# Patient Record
Sex: FEMALE | Race: WHITE
[De-identification: names, ages, dates, MRNs, and addresses within clinical notes are randomized per-mention and may not be internally consistent; named-entity substitution may affect disease eponyms.]

---
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
## 2021-06-14 ENCOUNTER — LABORATORY RESULT (OUTPATIENT)
Age: 21
End: 2021-06-14

## 2021-06-14 ENCOUNTER — APPOINTMENT (OUTPATIENT)
Dept: PEDIATRICS | Facility: CLINIC | Age: 21
End: 2021-06-14
Payer: COMMERCIAL

## 2021-06-14 VITALS
HEIGHT: 67 IN | RESPIRATION RATE: 12 BRPM | SYSTOLIC BLOOD PRESSURE: 108 MMHG | BODY MASS INDEX: 20.78 KG/M2 | WEIGHT: 132.38 LBS | DIASTOLIC BLOOD PRESSURE: 74 MMHG | TEMPERATURE: 98 F | HEART RATE: 80 BPM

## 2021-06-14 DIAGNOSIS — R11.2 NAUSEA WITH VOMITING, UNSPECIFIED: ICD-10-CM

## 2021-06-14 DIAGNOSIS — Z00.01 ENCOUNTER FOR GENERAL ADULT MEDICAL EXAMINATION WITH ABNORMAL FINDINGS: ICD-10-CM

## 2021-06-14 LAB
BILIRUB UR QL STRIP: NORMAL
CLARITY UR: CLEAR
GLUCOSE UR-MCNC: NORMAL
HCG UR QL: 0.2 EU/DL
HGB UR QL STRIP.AUTO: NORMAL
KETONES UR-MCNC: NORMAL
LEUKOCYTE ESTERASE UR QL STRIP: NORMAL
NITRITE UR QL STRIP: NORMAL
PH UR STRIP: 7
PROT UR STRIP-MCNC: NORMAL
SP GR UR STRIP: 1.01

## 2021-06-14 PROCEDURE — 92551 PURE TONE HEARING TEST AIR: CPT

## 2021-06-14 PROCEDURE — 81003 URINALYSIS AUTO W/O SCOPE: CPT | Mod: QW

## 2021-06-14 PROCEDURE — 90715 TDAP VACCINE 7 YRS/> IM: CPT

## 2021-06-14 PROCEDURE — 90471 IMMUNIZATION ADMIN: CPT

## 2021-06-14 PROCEDURE — 90472 IMMUNIZATION ADMIN EACH ADD: CPT

## 2021-06-14 PROCEDURE — 99385 PREV VISIT NEW AGE 18-39: CPT | Mod: 25

## 2021-06-14 NOTE — HISTORY OF PRESENT ILLNESS
[Needs Immunizations] : needs immunizations [Eats meals with family] : eats meals with family [Has family members/adults to turn to for help] : has family members/adults to turn to for help [Is permitted and is able to make independent decisions] : Is permitted and is able to make independent decisions [Grade: ____] : Grade: [unfilled] [Normal Performance] : normal performance [Eats regular meals including adequate fruits and vegetables] : eats regular meals including adequate fruits and vegetables [Has friends] : has friends [At least 1 hour of physical activity a day] : at least 1 hour of physical activity a day [Drinks alcohol] : drinks alcohol [No] : No cigarette smoke exposure [Uses safety belts/safety equipment] : uses safety belts/safety equipment  [Yes] : Patient has had sexual intercourse. [Has ways to cope with stress] : has ways to cope with stress [Displays self-confidence] : displays self-confidence [With Teen] : teen [Sleep Concerns] : no sleep concerns [Has concerns about body or appearance] : does not have concerns about body or appearance [Uses electronic nicotine delivery system] : does not use electronic nicotine delivery system [Exposure to electronic nicotine delivery system] : no exposure to electronic nicotine delivery system [Uses tobacco] : does not use tobacco [Exposure to tobacco] : no exposure to tobacco [Uses drugs] : does not use drugs  [Exposure to drugs] : no exposure to drugs [Exposure to alcohol] : no exposure to alcohol [Has problems with sleep] : does not have problems with sleep [Gets depressed, anxious, or irritable/has mood swings] : does not get depressed, anxious, or irritable/has mood swings [Has thought about hurting self or considered suicide] : has not thought about hurting self or considered suicide [de-identified] : self [de-identified] : see below [FreeTextEntry8] : IUD no menses [de-identified] : vegetarian, exercises  [de-identified] : occasional socially [de-identified] : boyfriend, condoms [FreeTextEntry1] : 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 eats a lot of soy products. also with emesis post meals stool pattern fluctuates both non bloody\par works out daily\par degree in education

## 2021-06-14 NOTE — HISTORY OF PRESENT ILLNESS
[Needs Immunizations] : needs immunizations [Eats meals with family] : eats meals with family [Has family members/adults to turn to for help] : has family members/adults to turn to for help [Is permitted and is able to make independent decisions] : Is permitted and is able to make independent decisions [Grade: ____] : Grade: [unfilled] [Normal Performance] : normal performance [Eats regular meals including adequate fruits and vegetables] : eats regular meals including adequate fruits and vegetables [Has friends] : has friends [At least 1 hour of physical activity a day] : at least 1 hour of physical activity a day [Drinks alcohol] : drinks alcohol [No] : No cigarette smoke exposure [Uses safety belts/safety equipment] : uses safety belts/safety equipment  [Yes] : Patient has had sexual intercourse. [Has ways to cope with stress] : has ways to cope with stress [Displays self-confidence] : displays self-confidence [With Teen] : teen [Sleep Concerns] : no sleep concerns [Has concerns about body or appearance] : does not have concerns about body or appearance [Uses electronic nicotine delivery system] : does not use electronic nicotine delivery system [Exposure to electronic nicotine delivery system] : no exposure to electronic nicotine delivery system [Uses tobacco] : does not use tobacco [Exposure to tobacco] : no exposure to tobacco [Uses drugs] : does not use drugs  [Exposure to drugs] : no exposure to drugs [Exposure to alcohol] : no exposure to alcohol [Has problems with sleep] : does not have problems with sleep [Gets depressed, anxious, or irritable/has mood swings] : does not get depressed, anxious, or irritable/has mood swings [Has thought about hurting self or considered suicide] : has not thought about hurting self or considered suicide [de-identified] : self [de-identified] : see below [FreeTextEntry8] : IUD no menses [de-identified] : vegetarian, exercises  [de-identified] : occasional socially [de-identified] : boyfriend, condoms [FreeTextEntry1] : 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 eats a lot of soy products. also with emesis post meals stool pattern fluctuates both non bloody\par works out daily\par degree in education

## 2021-06-14 NOTE — DISCUSSION/SUMMARY
[FreeTextEntry1] : exam wnl\par labs drawn\par GI consult based on labs\par not at risk for TB\par adacel given\par f/u w internist\par discussed nutrition-diary for sx as well

## 2021-06-17 ENCOUNTER — NON-APPOINTMENT (OUTPATIENT)
Age: 21
End: 2021-06-17

## 2021-06-17 DIAGNOSIS — D50.9 IRON DEFICIENCY ANEMIA, UNSPECIFIED: ICD-10-CM

## 2021-06-17 LAB
ALBUMIN SERPL ELPH-MCNC: 4.7 G/DL
ALP BLD-CCNC: 87 U/L
ALT SERPL-CCNC: 12 U/L
ANION GAP SERPL CALC-SCNC: 10 MMOL/L
AST SERPL-CCNC: 21 U/L
BASOPHILS # BLD AUTO: 0.03 K/UL
BASOPHILS NFR BLD AUTO: 0.7 %
BILIRUB SERPL-MCNC: 0.2 MG/DL
BUN SERPL-MCNC: 9 MG/DL
C TRACH RRNA SPEC QL NAA+PROBE: NOT DETECTED
CALCIUM SERPL-MCNC: 10.2 MG/DL
CELIACPAN: NORMAL
CHLORIDE SERPL-SCNC: 102 MMOL/L
CHOLEST SERPL-MCNC: 167 MG/DL
CO2 SERPL-SCNC: 28 MMOL/L
CREAT SERPL-MCNC: 0.77 MG/DL
CRP SERPL-MCNC: <3 MG/L
ENDOMYSIUM IGA SER QL: NEGATIVE
ENDOMYSIUM IGA TITR SER: NORMAL
EOSINOPHIL # BLD AUTO: 0.04 K/UL
EOSINOPHIL NFR BLD AUTO: 0.9 %
ERYTHROCYTE [SEDIMENTATION RATE] IN BLOOD BY WESTERGREN METHOD: 3 MM/HR
FERRITIN SERPL-MCNC: 7 NG/ML
FOLATE SERPL-MCNC: >20 NG/ML
GLIADIN IGA SER QL: <5 UNITS
GLIADIN IGG SER QL: <5 UNITS
GLIADIN PEPTIDE IGA SER-ACNC: NEGATIVE
GLIADIN PEPTIDE IGG SER-ACNC: NEGATIVE
GLUCOSE SERPL-MCNC: 102 MG/DL
HCT VFR BLD CALC: 37.9 %
HDLC SERPL-MCNC: 77 MG/DL
HGB BLD-MCNC: 11.3 G/DL
IMM GRANULOCYTES NFR BLD AUTO: 0 %
IRON SATN MFR SERPL: 6 %
IRON SERPL-MCNC: 29 UG/DL
LDLC SERPL CALC-MCNC: 73 MG/DL
LYMPHOCYTES # BLD AUTO: 1.3 K/UL
LYMPHOCYTES NFR BLD AUTO: 30.7 %
M TB IFN-G BLD-IMP: NEGATIVE
MAN DIFF?: NORMAL
MCHC RBC-ENTMCNC: 25.6 PG
MCHC RBC-ENTMCNC: 29.8 GM/DL
MCV RBC AUTO: 85.9 FL
MONOCYTES # BLD AUTO: 0.29 K/UL
MONOCYTES NFR BLD AUTO: 6.9 %
N GONORRHOEA RRNA SPEC QL NAA+PROBE: NOT DETECTED
NEUTROPHILS # BLD AUTO: 2.57 K/UL
NEUTROPHILS NFR BLD AUTO: 60.8 %
NONHDLC SERPL-MCNC: 91 MG/DL
PLATELET # BLD AUTO: 231 K/UL
POTASSIUM SERPL-SCNC: 4.2 MMOL/L
PROT SERPL-MCNC: 7.1 G/DL
QUANTIFERON TB PLUS MITOGEN MINUS NIL: 1.58 IU/ML
QUANTIFERON TB PLUS NIL: 0.02 IU/ML
QUANTIFERON TB PLUS TB1 MINUS NIL: 0.02 IU/ML
QUANTIFERON TB PLUS TB2 MINUS NIL: 0.02 IU/ML
RBC # BLD: 4.41 M/UL
RBC # FLD: 14.5 %
SODIUM SERPL-SCNC: 140 MMOL/L
SOURCE AMPLIFICATION: NORMAL
TIBC SERPL-MCNC: 478 UG/DL
TRIGL SERPL-MCNC: 87 MG/DL
UIBC SERPL-MCNC: 449 UG/DL
VIT B12 SERPL-MCNC: 381 PG/ML
WBC # FLD AUTO: 4.23 K/UL

## 2021-06-17 RX ORDER — TRETINOIN 1 MG/G
0.1 CREAM TOPICAL
Qty: 45 | Refills: 0 | Status: DISCONTINUED | COMMUNITY
Start: 2021-01-07

## 2021-06-17 RX ORDER — CLINDAMYCIN PHOSPHATE AND BENZOYL PEROXIDE 10; 50 MG/G; MG/G
1.2-5 GEL TOPICAL
Qty: 45 | Refills: 0 | Status: DISCONTINUED | COMMUNITY
Start: 2020-12-07

## 2021-06-23 LAB
ANTI-A4 FLA2 IGG ELISA: 14.7 EU/ML
ANTI-CBIR1 ELISA: 11.9 EU/ML
ANTI-FLAX IGG ELISA: 15.1 EU/ML
ANTI-OMPC IGA ELISA: < 3.1 EU/ML
ASCA IGA ELISA: < 3.1 EU/ML
ASCA IGG ELISA: 3.2 EU/ML
ATG16L1 SNP (RS2241880): NORMAL
CRP PROMTHEUS: 0.3 MG/L
ECM1 SNP (RS3737240): NORMAL
IBD AB 7 PNL SER: NORMAL
IBD-SPECIFIC PANCA IFA PATTERN DNASE SENSITIVITY: NOT DETECTED
IBD-SPECIFIC PANCA IFA PERINUCLEAR PATTERN: NOT DETECTED
ICAM-1 PROMETHEUS: 0.43 UG/ML
NKX2-3 SNP (RS10883365): NORMAL
PROMETHEUS LABORATORY FOOTER: NORMAL
SAA PROMETHEUS: 1.9 MG/L
STAT3 SNP (RS744166): NORMAL
VCAM-1 PROMETHEUS: 0.62 UG/ML
VEGF PROMETHEUS: < 31 PG/ML

## 2023-03-01 ENCOUNTER — NON-APPOINTMENT (OUTPATIENT)
Age: 23
End: 2023-03-01